# Patient Record
Sex: MALE | Race: WHITE | NOT HISPANIC OR LATINO | URBAN - METROPOLITAN AREA
[De-identification: names, ages, dates, MRNs, and addresses within clinical notes are randomized per-mention and may not be internally consistent; named-entity substitution may affect disease eponyms.]

---

## 2017-05-24 ENCOUNTER — APPOINTMENT (RX ONLY)
Dept: URBAN - METROPOLITAN AREA CLINIC 57 | Facility: CLINIC | Age: 37
Setting detail: DERMATOLOGY
End: 2017-05-24

## 2017-05-24 DIAGNOSIS — L20.89 OTHER ATOPIC DERMATITIS: ICD-10-CM

## 2017-05-24 PROBLEM — L70.0 ACNE VULGARIS: Status: ACTIVE | Noted: 2017-05-24

## 2017-05-24 PROBLEM — L55.1 SUNBURN OF SECOND DEGREE: Status: ACTIVE | Noted: 2017-05-24

## 2017-05-24 PROBLEM — L40.0 PSORIASIS VULGARIS: Status: ACTIVE | Noted: 2017-05-24

## 2017-05-24 PROBLEM — L30.9 DERMATITIS, UNSPECIFIED: Status: ACTIVE | Noted: 2017-05-24

## 2017-05-24 PROBLEM — L23.7 ALLERGIC CONTACT DERMATITIS DUE TO PLANTS, EXCEPT FOOD: Status: ACTIVE | Noted: 2017-05-24

## 2017-05-24 PROCEDURE — ? COUNSELING

## 2017-05-24 PROCEDURE — 99212 OFFICE O/P EST SF 10 MIN: CPT

## 2017-05-24 PROCEDURE — ? TREATMENT REGIMEN

## 2017-05-24 PROCEDURE — ? PRESCRIPTION

## 2017-05-24 RX ORDER — DIFLORASONE DIACETATE 0.5 MG/G
OINTMENT TOPICAL
Qty: 60 | Refills: 2 | Status: ERX | COMMUNITY
Start: 2017-05-24

## 2017-05-24 RX ADMIN — DIFLORASONE DIACETATE: 0.5 OINTMENT TOPICAL at 13:22

## 2017-05-24 ASSESSMENT — LOCATION SIMPLE DESCRIPTION DERM
LOCATION SIMPLE: RIGHT FOREARM
LOCATION SIMPLE: RIGHT HAND
LOCATION SIMPLE: LEFT FOREARM
LOCATION SIMPLE: LEFT HAND

## 2017-05-24 ASSESSMENT — LOCATION DETAILED DESCRIPTION DERM
LOCATION DETAILED: LEFT DISTAL DORSAL FOREARM
LOCATION DETAILED: RIGHT RADIAL DORSAL HAND
LOCATION DETAILED: LEFT ULNAR DORSAL HAND
LOCATION DETAILED: RIGHT DISTAL DORSAL FOREARM

## 2017-05-24 ASSESSMENT — LOCATION ZONE DERM
LOCATION ZONE: ARM
LOCATION ZONE: HAND

## 2017-05-24 NOTE — PROCEDURE: TREATMENT REGIMEN
Continue Regimen: Generic Psorcon ointment
Detail Level: Simple
Plan: Call regarding progress in 2-3 weeks
Otc Regimen: Moisturize with Vaseline

## 2018-10-10 RX ORDER — DIFLORASONE DIACETATE 0.5 MG/G
OINTMENT TOPICAL
Qty: 60 | Refills: 1 | Status: ERX